# Patient Record
Sex: MALE | Race: WHITE | Employment: FULL TIME | URBAN - METROPOLITAN AREA
[De-identification: names, ages, dates, MRNs, and addresses within clinical notes are randomized per-mention and may not be internally consistent; named-entity substitution may affect disease eponyms.]

---

## 2017-08-11 ENCOUNTER — HOSPITAL ENCOUNTER (OUTPATIENT)
Dept: GENERAL RADIOLOGY | Age: 57
Discharge: HOME | End: 2017-08-11
Payer: COMMERCIAL

## 2017-08-11 DIAGNOSIS — I10 HYPERTENSION: ICD-10-CM

## 2017-08-11 PROCEDURE — 71020 XR CHEST PA LAT: CPT

## 2017-08-15 ENCOUNTER — HOSPITAL ENCOUNTER (OUTPATIENT)
Dept: GENERAL RADIOLOGY | Age: 57
Discharge: HOME | End: 2017-08-15
Payer: COMMERCIAL

## 2017-08-15 DIAGNOSIS — R93.89 ABNORMAL CXR: ICD-10-CM

## 2017-08-15 PROCEDURE — 71020 XR CHEST PA LAT: CPT

## 2018-04-16 ENCOUNTER — HOSPITAL ENCOUNTER (OUTPATIENT)
Dept: MRI IMAGING | Age: 58
Discharge: HOME | End: 2018-04-16
Attending: ORTHOPAEDIC SURGERY
Payer: COMMERCIAL

## 2018-04-16 VITALS — WEIGHT: 183 LBS

## 2018-04-16 DIAGNOSIS — M25.80 SESAMOIDITIS: ICD-10-CM

## 2018-04-16 DIAGNOSIS — M25.842 MASS OF JOINT OF LEFT HAND: ICD-10-CM

## 2018-04-16 PROCEDURE — 73220 MRI UPPR EXTREMITY W/O&W/DYE: CPT

## 2018-04-16 PROCEDURE — 74011250636 HC RX REV CODE- 250/636: Performed by: ORTHOPAEDIC SURGERY

## 2018-04-16 PROCEDURE — A9575 INJ GADOTERATE MEGLUMI 0.1ML: HCPCS | Performed by: ORTHOPAEDIC SURGERY

## 2018-04-16 RX ORDER — GADOTERATE MEGLUMINE 376.9 MG/ML
17 INJECTION INTRAVENOUS
Status: COMPLETED | OUTPATIENT
Start: 2018-04-16 | End: 2018-04-16

## 2018-04-16 RX ADMIN — GADOTERATE MEGLUMINE 17 ML: 376.9 INJECTION INTRAVENOUS at 20:53

## 2021-12-23 ENCOUNTER — OFFICE VISIT (OUTPATIENT)
Dept: ORTHOPEDIC SURGERY | Age: 61
End: 2021-12-23
Payer: COMMERCIAL

## 2021-12-23 VITALS — BODY MASS INDEX: 29.73 KG/M2 | HEIGHT: 66 IN | WEIGHT: 185 LBS

## 2021-12-23 DIAGNOSIS — M51.26 HNP (HERNIATED NUCLEUS PULPOSUS), LUMBAR: Primary | ICD-10-CM

## 2021-12-23 DIAGNOSIS — M51.36 LUMBAR DEGENERATIVE DISC DISEASE: ICD-10-CM

## 2021-12-23 PROCEDURE — 99203 OFFICE O/P NEW LOW 30 MIN: CPT | Performed by: ORTHOPAEDIC SURGERY

## 2021-12-23 RX ORDER — BUSPIRONE HYDROCHLORIDE 10 MG/1
10 TABLET ORAL 3 TIMES DAILY
COMMUNITY

## 2021-12-23 RX ORDER — MELATONIN
1000 DAILY
COMMUNITY

## 2021-12-23 RX ORDER — ROSUVASTATIN CALCIUM 10 MG/1
10 TABLET, COATED ORAL
COMMUNITY

## 2021-12-23 NOTE — PROGRESS NOTES
Tram Hills (: 1960) is a 59 y.o. male, patient, here for evaluation of the following chief complaint(s):  Back Pain (Xrays and MRI @[facility])       ASSESSMENT/PLAN:    Below is the assessment and plan developed based on review of pertinent history, physical exam, labs, studies, and medications. He did have a right-sided disc herniation with a small free fragment at L3-4. He has no motor or sensory deficits today. His main complaint is his right posterior buttock region. He has maximized physical therapy. I do not think he will be a surgical candidate. However I think he is a good candidate for right-sided L3-4 L4-5 epidural.  We will refer him for those. He will see us back if the symptoms persist.    1. HNP (herniated nucleus pulposus), lumbar  -     REFERRAL TO SPINE INJECTION      No follow-ups on file. SUBJECTIVE/OBJECTIVE:  Tarm Hills (: 1960) is a 59 y.o. male. No flowsheet data found. He comes in today for an evaluation of a 6-month history of right-sided posterior buttock and posterior leg pain. It started late  with no acute trauma. He had initially severe pain with radiation all the way down to the right lateral calf region. He has been in extensive physical therapy and has had medications intermittently with ongoing symptoms. Most of the sciatica type pain has improved but he still complains mainly of right posterior buttock pain. Its intermittent in nature and not too severe. He is able to workout 3 times a week. He denies any bowel bladder difficulties. He is not on any current medications for this. Imaging:    I reviewed his outside MRI from [facility] on 11/3/2021. Spondylosis is noted in the lumbar spine as expected. He has right-sided L3-4 small disc protrusion causing some lateral recess stenosis. Broad-based disc protrusion at L4-5 causing foraminal stenosis.       XR Results (most recent):  Results from [facility] encounter on 08/15/17    XR CHEST PA LAT    Narrative  CHEST RADIOGRAPHS: 8/15/2017 3:47 PM    INDICATION: Abnormal chest radiograph. COMPARISON: 8/11/2017, 8/10/2016, 8/10/2015. TECHNIQUE: Bilateral oblique radiographs of the chest.    FINDINGS:  The questionable nodule in the right lung base corresponds to the nipple marker  and shallow oblique view. On standard frontal view 8/11/2017, it is not  significantly different from 8/10/2015. The lungs are clear. The central airways  are patent. The heart size is normal. No pneumothorax or pleural effusion. Impression  IMPRESSION:  Clear lungs. MRI Results (most recent):  Results from [facility] [place] encounter on 04/16/18    MRI HAND LT W WO CONT    Narrative  EXAM:  MRI HAND LT W WO CONT    INDICATION:  Left hand palpable mass or cyst at the first MCP joint    COMPARISON: None    TECHNIQUE: Axial, coronal, and sagittal MRI of the left hand centered at the  first MCP joint in the T1 and T2 pulse sequences with and without fat saturation  . CONTRAST: Pre and post IV contrast 17 mL Dotarem    FINDINGS: Bone marrow: within normal limits. No fracture, dislocation, or marrow  replacing process. No evidence of stress reaction or periostitis. Joint fluid:  First MCP joint fluid is physiologic. Tendons: Intact. Muscles: Subtle edema is within around the distal thenar musculature  myotendinous junction at its insertion on the first proximal phalanx. Neurovascular bundles: Within normal limits. Articular cartilage: intact. No osteochondral lesion. Soft tissue mass: No mass or cyst.    Impression  IMPRESSION:  1. Mild strain of the distal insertion of the thenar myotendinous junction at  the site of palpable finding. 2. No mass or cyst.         No Known Allergies    Current Outpatient Medications   Medication Sig    rosuvastatin (Crestor) 10 mg tablet Take 10 mg by mouth nightly.     busPIRone (BUSPAR) 10 mg tablet Take 10 mg by mouth three (3) times daily.  vortioxetine (TRINTELLIX) 20 mg tablet Take  by mouth.  cholecalciferol (VITAMIN D3) (1000 Units /25 mcg) tablet Take 1,000 Units by mouth daily. No current facility-administered medications for this visit. No past medical history on file. No past surgical history on file. No family history on file. Social History     Tobacco Use    Smoking status: Not on file    Smokeless tobacco: Not on file   Substance Use Topics    Alcohol use: Not on file    Drug use: Not on file        Review of Systems   Constitutional: Positive for activity change. Musculoskeletal: Positive for back pain. All other systems reviewed and are negative. Vitals:  Ht 5' 6\" (1.676 m)   Wt 185 lb (83.9 kg)   BMI 29.86 kg/m²    Body mass index is 29.86 kg/m². Ortho Exam       Alert and [place]  x 3    Normal gait and station; normal posture    No assistive devices today. Cervical spine:  Examination of the cervical spine demonstrates no tenderness on palpation, no pain, no swelling or edema, with normal lumbar range of motion. Thoracic spine: Examination of the thoracic spine demonstrates no tenderness on palpation, no pain, no swelling or edema. Normal sensation and range of motion. Lumbar spine:     Examination of the lumbar spine demonstrates on inspection: No abnormal cutaneous markings. No pelvic obliquity. No masses. On palpation: Tenderness on the right sciatic notch region and posterior buttock region.     Range of motion: Not tested    Motor examination:  Right iliopsoas 5/5,  left iliopsoas 5/5, right quad 5/5, left quad 5/5, right anterior tibialis 5/5, left anterior tibialis 5/5, right EHL 5/5, left EHL 5/5, right gastrocnemius 5/5 , left gastrocnemius 5/5    Sensory examination: Reveals no deficits today    Reflexes: Left knee 2/2, right knee 2/2, right ankle 2/2, left ankle 2/2    Functional testing: Straight leg test negative; bowstring sign negative    Babinsksi and Clonus negative bilaterally. An electronic signature was used to authenticate this note.   -- Alvaro Thorne MD

## 2022-03-18 PROBLEM — M51.26 HNP (HERNIATED NUCLEUS PULPOSUS), LUMBAR: Status: ACTIVE | Noted: 2021-12-23

## 2022-03-20 PROBLEM — M51.36 LUMBAR DEGENERATIVE DISC DISEASE: Status: ACTIVE | Noted: 2021-12-23

## 2022-03-20 PROBLEM — M51.369 LUMBAR DEGENERATIVE DISC DISEASE: Status: ACTIVE | Noted: 2021-12-23

## 2023-05-24 RX ORDER — BUSPIRONE HYDROCHLORIDE 10 MG/1
10 TABLET ORAL 3 TIMES DAILY
COMMUNITY

## 2023-05-24 RX ORDER — ROSUVASTATIN CALCIUM 10 MG/1
10 TABLET, COATED ORAL NIGHTLY
COMMUNITY

## 2025-08-22 ENCOUNTER — TRANSCRIBE ORDERS (OUTPATIENT)
Facility: HOSPITAL | Age: 65
End: 2025-08-22

## 2025-08-22 DIAGNOSIS — K76.3 INFARCTION OF LIVER: Primary | ICD-10-CM
